# Patient Record
Sex: MALE | URBAN - METROPOLITAN AREA
[De-identification: names, ages, dates, MRNs, and addresses within clinical notes are randomized per-mention and may not be internally consistent; named-entity substitution may affect disease eponyms.]

---
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
## 2022-04-05 ENCOUNTER — NON-APPOINTMENT (OUTPATIENT)
Age: 22
End: 2022-04-05

## 2022-04-05 RX ORDER — METHYLPREDNISOLONE 4 MG/1
4 TABLET ORAL
Qty: 1 | Refills: 0 | Status: ACTIVE | COMMUNITY
Start: 2022-04-05 | End: 1900-01-01

## 2023-10-13 ENCOUNTER — APPOINTMENT (OUTPATIENT)
Dept: ORTHOPEDIC SURGERY | Facility: CLINIC | Age: 23
End: 2023-10-13
Payer: COMMERCIAL

## 2023-10-13 ENCOUNTER — NON-APPOINTMENT (OUTPATIENT)
Age: 23
End: 2023-10-13

## 2023-10-13 DIAGNOSIS — S60.022A CONTUSION OF LEFT INDEX FINGER W/OUT DAMAGE TO NAIL, INITIAL ENCOUNTER: ICD-10-CM

## 2023-10-13 DIAGNOSIS — Z00.00 ENCOUNTER FOR GENERAL ADULT MEDICAL EXAMINATION W/OUT ABNORMAL FINDINGS: ICD-10-CM

## 2023-10-13 PROCEDURE — 99203 OFFICE O/P NEW LOW 30 MIN: CPT

## 2023-10-13 PROCEDURE — 73140 X-RAY EXAM OF FINGER(S): CPT | Mod: F1

## 2023-12-18 ENCOUNTER — NON-APPOINTMENT (OUTPATIENT)
Age: 23
End: 2023-12-18

## 2024-01-11 ENCOUNTER — APPOINTMENT (OUTPATIENT)
Dept: PAIN MANAGEMENT | Facility: CLINIC | Age: 24
End: 2024-01-11
Payer: COMMERCIAL

## 2024-01-11 VITALS
HEART RATE: 70 BPM | BODY MASS INDEX: 24.3 KG/M2 | WEIGHT: 210 LBS | HEIGHT: 78 IN | SYSTOLIC BLOOD PRESSURE: 106 MMHG | DIASTOLIC BLOOD PRESSURE: 66 MMHG

## 2024-01-11 DIAGNOSIS — Z78.9 OTHER SPECIFIED HEALTH STATUS: ICD-10-CM

## 2024-01-11 PROCEDURE — 99204 OFFICE O/P NEW MOD 45 MIN: CPT
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
## 2024-01-11 NOTE — PHYSICAL EXAM
[FreeTextEntry1] : Constitutional: No signs of distress or signs of toxicity. Mental Status: Alert and well oriented. Speech fluent. No aphasia. Fund of knowledge intact. MMSE- 30/30 Psychiatric: Mood stable Cranial Nerve: PERRLA: No papilledema; No VFC; EOM full. no nystagmus. No Helio. V1-3 intact. No facial asymmetry, hearing grossly intact; palate elevates symmetrically, tongue midline Motor: No involuntary movements noted.  Adequate bulk, tone throughout, 5/5 strength of all muscle groups DTR: present and symmetrical; no clonus, plantars  downgoing Sensory: intact to primary and secondary modalities; neg Romberg Cerebellar: adequate finger to nose and heel to shin bilaterally. Gait: non antalgic or ataxic. Eyes: no redness or swelling HEENT: intact; no signs of trauma. Neck: No masses noted Pulmonary: no respiratory distress Vascular: no temperature, color change or sudomotor changes.; no edema Musculoskeletal: examination of the cervical spine reveals no midline tenderness, range of motion full upon flexion, extension and lateral rotation. Negative facet tenderness, Negative Spurlings bilaterally. examination of the lumbar spine reveals no midline or paraspinal tenderness; Range of motion full upon flexion, extension and lateral rotation; negative facet loading, No tenderness of sciatic notch, No tenderness of bilateral greater trochanters, Negative ESTEE, negative SLRT bilaterally, Skin: No rash.

## 2024-01-12 ENCOUNTER — APPOINTMENT (OUTPATIENT)
Dept: PAIN MANAGEMENT | Facility: CLINIC | Age: 24
End: 2024-01-12

## 2024-01-16 ENCOUNTER — OUTPATIENT (OUTPATIENT)
Dept: OUTPATIENT SERVICES | Facility: HOSPITAL | Age: 24
LOS: 1 days | End: 2024-01-16
Payer: COMMERCIAL

## 2024-01-16 ENCOUNTER — APPOINTMENT (OUTPATIENT)
Dept: MRI IMAGING | Facility: CLINIC | Age: 24
End: 2024-01-16
Payer: COMMERCIAL

## 2024-01-16 DIAGNOSIS — S06.0XAA CONCUSSION WITH LOSS OF CONSCIOUSNESS STATUS UNKNOWN, INITIAL ENCOUNTER: ICD-10-CM

## 2024-01-16 PROCEDURE — 70551 MRI BRAIN STEM W/O DYE: CPT | Mod: 26

## 2024-01-16 PROCEDURE — 70551 MRI BRAIN STEM W/O DYE: CPT

## 2024-01-30 ENCOUNTER — TRANSCRIPTION ENCOUNTER (OUTPATIENT)
Age: 24
End: 2024-01-30

## 2024-02-06 ENCOUNTER — APPOINTMENT (OUTPATIENT)
Dept: PAIN MANAGEMENT | Facility: CLINIC | Age: 24
End: 2024-02-06
Payer: COMMERCIAL

## 2024-02-06 VITALS
HEIGHT: 78 IN | SYSTOLIC BLOOD PRESSURE: 114 MMHG | HEART RATE: 67 BPM | DIASTOLIC BLOOD PRESSURE: 80 MMHG | WEIGHT: 210 LBS | BODY MASS INDEX: 24.3 KG/M2

## 2024-02-06 PROCEDURE — 99214 OFFICE O/P EST MOD 30 MIN: CPT

## 2024-02-06 RX ORDER — METHYLPREDNISOLONE 4 MG/1
4 TABLET ORAL
Qty: 1 | Refills: 0 | Status: ACTIVE | COMMUNITY
Start: 2024-01-11 | End: 1900-01-01

## 2024-02-06 NOTE — HISTORY OF PRESENT ILLNESS
[FreeTextEntry1] : Patient reports a reduction in headaches from 5/10 to 3/10; the fogginess improved. all since the steroids.  Sleep is typically ok.  - taking magnesium 400 mgs qhs most nights.   10-20 minutes on bike and running - no excaverbation.

## 2024-02-06 NOTE — PHYSICAL EXAM
[FreeTextEntry1] : Constitutional: No signs of distress or signs of toxicity. Mental Status: Alert and well oriented. Speech fluent. No aphasia. Fund of knowledge intact. Psychiatric: Mood stable. No cranial nerve abnormality seen. Gait: non antalgic or ataxic. Eyes: no redness or swelling HEENT: intact; no signs of trauma. Neck: No masses noted Pulmonary: no respiratory distress Skin: No rash. Musculoskeletal: Cervical range of motion full in all directions.  Negative Spurling's maneuver.  No spinal or paraspinal tenderness noted.  Thoracic: No spinal or paraspinal tenderness noted.  Lumbar sacral: Range of motion full in all directions.  No spinal or paraspinal tenderness noted.  Straight leg raise tested -0 to 60 degrees bilaterally.

## 2024-02-06 NOTE — ASSESSMENT
[FreeTextEntry1] : Concussion Post concussion headaches  Will retry Medrol dose justyna.   Gradually increase activities. Basketball workouts. with  only